# Patient Record
Sex: MALE | Race: WHITE | Employment: OTHER | URBAN - NONMETROPOLITAN AREA
[De-identification: names, ages, dates, MRNs, and addresses within clinical notes are randomized per-mention and may not be internally consistent; named-entity substitution may affect disease eponyms.]

---

## 2024-01-30 PROBLEM — I44.7 LBBB (LEFT BUNDLE BRANCH BLOCK): Status: ACTIVE | Noted: 2024-01-30

## 2024-01-30 PROBLEM — F17.200 CURRENT EVERY DAY SMOKER: Status: ACTIVE | Noted: 2024-01-30

## 2024-01-30 PROBLEM — I50.9 DYSPNEA DUE TO CONGESTIVE HEART FAILURE (MULTI): Status: ACTIVE | Noted: 2024-01-30

## 2024-01-30 PROBLEM — J44.9 CHRONIC OBSTRUCTIVE PULMONARY DISEASE (MULTI): Status: ACTIVE | Noted: 2024-01-30

## 2024-01-30 PROBLEM — I10 ESSENTIAL HYPERTENSION, BENIGN: Status: ACTIVE | Noted: 2024-01-30

## 2024-01-30 PROBLEM — E78.5 DYSLIPIDEMIA: Status: ACTIVE | Noted: 2024-01-30

## 2024-01-30 PROBLEM — I42.9 CARDIOMYOPATHY (MULTI): Status: ACTIVE | Noted: 2024-01-30

## 2024-01-30 RX ORDER — ALBUTEROL SULFATE 90 UG/1
AEROSOL, METERED RESPIRATORY (INHALATION)
COMMUNITY
Start: 2021-09-10

## 2024-01-30 RX ORDER — CARVEDILOL 12.5 MG/1
1 TABLET ORAL 2 TIMES DAILY
COMMUNITY
End: 2024-02-29 | Stop reason: SDUPTHER

## 2024-01-30 RX ORDER — NITROGLYCERIN 0.4 MG/1
0.4 TABLET SUBLINGUAL EVERY 5 MIN PRN
COMMUNITY

## 2024-01-30 RX ORDER — ASPIRIN 81 MG/1
2 TABLET ORAL DAILY
COMMUNITY

## 2024-01-30 RX ORDER — HYDROCHLOROTHIAZIDE 12.5 MG/1
12.5 TABLET ORAL DAILY
COMMUNITY

## 2024-01-30 RX ORDER — ALBUTEROL SULFATE 0.83 MG/ML
SOLUTION RESPIRATORY (INHALATION)
COMMUNITY
Start: 2021-09-27

## 2024-01-30 RX ORDER — SPIRONOLACTONE 25 MG/1
1 TABLET ORAL DAILY
COMMUNITY

## 2024-01-30 RX ORDER — LOSARTAN POTASSIUM 50 MG/1
50 TABLET ORAL 2 TIMES DAILY
COMMUNITY
End: 2024-04-03 | Stop reason: SDUPTHER

## 2024-01-30 RX ORDER — ATORVASTATIN CALCIUM 80 MG/1
1 TABLET, FILM COATED ORAL DAILY
COMMUNITY
End: 2024-03-01 | Stop reason: SDUPTHER

## 2024-02-20 ENCOUNTER — OFFICE VISIT (OUTPATIENT)
Dept: CARDIOLOGY | Facility: CLINIC | Age: 64
End: 2024-02-20
Payer: MEDICARE

## 2024-02-20 VITALS
HEIGHT: 74 IN | BODY MASS INDEX: 26.69 KG/M2 | SYSTOLIC BLOOD PRESSURE: 120 MMHG | WEIGHT: 208 LBS | HEART RATE: 64 BPM | DIASTOLIC BLOOD PRESSURE: 70 MMHG

## 2024-02-20 DIAGNOSIS — I42.9 CARDIOMYOPATHY, UNSPECIFIED TYPE (MULTI): ICD-10-CM

## 2024-02-20 DIAGNOSIS — F17.200 CURRENT EVERY DAY SMOKER: ICD-10-CM

## 2024-02-20 DIAGNOSIS — I10 ESSENTIAL HYPERTENSION, BENIGN: Primary | ICD-10-CM

## 2024-02-20 DIAGNOSIS — E66.3 OVERWEIGHT: ICD-10-CM

## 2024-02-20 DIAGNOSIS — J41.0 SIMPLE CHRONIC BRONCHITIS (MULTI): ICD-10-CM

## 2024-02-20 DIAGNOSIS — I44.7 LBBB (LEFT BUNDLE BRANCH BLOCK): ICD-10-CM

## 2024-02-20 DIAGNOSIS — E78.5 DYSLIPIDEMIA: ICD-10-CM

## 2024-02-20 LAB
NON-UH HIE AGAP: 11 MEQ/L (ref 6–16)
NON-UH HIE ALT: 16 INT._UNIT/L (ref 6–46)
NON-UH HIE AST: 17 INT._UNIT/L (ref 5–43)
NON-UH HIE BUN/CREAT RATIO: 18 NO UNITS (ref 10–20)
NON-UH HIE BUN: 27 MG/DL (ref 5–21)
NON-UH HIE CALCIUM LVL: 9.3 MG/DL (ref 8.9–11.1)
NON-UH HIE CHLORIDE: 105 MMOL/L (ref 101–111)
NON-UH HIE CO2: 28 MMOL/L (ref 21–31)
NON-UH HIE CREATININE: 1.5 MG/DL (ref 0.5–1.3)
NON-UH HIE EGFR: 51 ML/MIN/1.73 M2
NON-UH HIE GLUCOSE LVL: 93 MG/DL (ref 55–199)
NON-UH HIE HCT: 38 % (ref 37.7–49)
NON-UH HIE HGB: 12.9 GM/DL (ref 13.5–17.5)
NON-UH HIE MCH: 31.3 PG (ref 27–34)
NON-UH HIE MCHC: 34.2 GM/DL (ref 31.4–36)
NON-UH HIE MCV: 91.5 FL (ref 80–100)
NON-UH HIE MPV: 9.3 FL (ref 6.4–10.8)
NON-UH HIE PLATELET: 209 E9/L (ref 150–500)
NON-UH HIE POTASSIUM LVL: 4.7 MMOL/L (ref 3.5–5.3)
NON-UH HIE RBC MORPH: NORMAL
NON-UH HIE RBC: 4.1 E12/L (ref 4.3–5.9)
NON-UH HIE RDW: 16.1 % (ref 10.9–14.2)
NON-UH HIE SODIUM LVL: 139 MMOL/L (ref 135–145)
NON-UH HIE WBC: 10.4 E9/L (ref 4–11)

## 2024-02-20 PROCEDURE — 4004F PT TOBACCO SCREEN RCVD TLK: CPT | Performed by: INTERNAL MEDICINE

## 2024-02-20 PROCEDURE — 99214 OFFICE O/P EST MOD 30 MIN: CPT | Performed by: INTERNAL MEDICINE

## 2024-02-20 PROCEDURE — 1159F MED LIST DOCD IN RCRD: CPT | Performed by: INTERNAL MEDICINE

## 2024-02-20 PROCEDURE — 3078F DIAST BP <80 MM HG: CPT | Performed by: INTERNAL MEDICINE

## 2024-02-20 PROCEDURE — 3074F SYST BP LT 130 MM HG: CPT | Performed by: INTERNAL MEDICINE

## 2024-02-20 RX ORDER — BACLOFEN 20 MG
1 TABLET ORAL DAILY
COMMUNITY

## 2024-02-20 NOTE — PROGRESS NOTES
"Subjective   Lewis Bertrand is a 63 y.o. male       Chief Complaint    Follow-up          HPI   Patient is in the office for follow-up for the problems noted below.  He has sedentary lifestyle and he smokes half a pack of cigarettes per day.  He does have COPD with active wheezing and utilizes nebulizer.  He saw his PCP recently but no blood work was ordered.  He denies any symptoms suggestive of recurrence of heart failure.  His medical therapy has been well-tolerated and his blood pressure is under excellent control.  His weight has increased 14 pounds from last visit which was brought to his attention    Assessment/recommendations:     1-history of nonischemic cardiopathy believed to be due to hypertensive heart disease. His coronary arteries have mild disease confirmed by cardiac catheterization in January 2019. His ejection fraction has normalized up to 65% by echocardiogram 2019. Due to mild hypotension hydralazine will be discontinued  2-active tobacco use, counseling was provided again today for tobacco cessation  4-COPD due to tobacco abuse, patient uses inhalers, currently his COPD is very active and he is following with PCP  5-dyslipidemia on statin, lab data were ordered  6- hypertension , due to hypotension will discontinue hydralazine  7-left bundle-branch block, has not affected his EF.  8- overweight, patient has gained 14 pounds in the last several months which was brought to his attention and education to eat smaller portions and stay physically active was provided  Review of Systems   All other systems reviewed and are negative.           Vitals:    02/20/24 1128   BP: 120/70   BP Location: Right arm   Patient Position: Sitting   Pulse: 64   Weight: 94.3 kg (208 lb)   Height: 1.88 m (6' 2\")        Objective   Physical Exam  Constitutional:       Appearance: Normal appearance. He is normal weight.   HENT:      Nose: Nose normal.   Neck:      Vascular: No carotid bruit.   Cardiovascular:      Rate " and Rhythm: Normal rate.      Pulses: Normal pulses.      Heart sounds: Normal heart sounds.   Pulmonary:      Effort: Pulmonary effort is normal.      Breath sounds: Examination of the right-upper field reveals wheezing. Examination of the left-upper field reveals wheezing. Examination of the right-lower field reveals wheezing. Examination of the left-lower field reveals wheezing. Wheezing present.   Abdominal:      General: Bowel sounds are normal.      Palpations: Abdomen is soft.   Genitourinary:     Rectum: Normal.   Musculoskeletal:         General: Normal range of motion.      Cervical back: Normal range of motion.      Right lower leg: No edema.      Left lower leg: No edema.   Skin:     General: Skin is warm and dry.   Neurological:      General: No focal deficit present.      Mental Status: He is alert.   Psychiatric:         Mood and Affect: Mood normal.         Behavior: Behavior normal.         Thought Content: Thought content normal.         Judgment: Judgment normal.         Allergies  Patient has no known allergies.     Current Medications    Current Outpatient Medications:     albuterol (Ventolin HFA) 90 mcg/actuation inhaler, Inhale., Disp: , Rfl:     albuterol 2.5 mg /3 mL (0.083 %) nebulizer solution, Inhale., Disp: , Rfl:     aspirin 81 mg EC tablet, Take 2 tablets (162 mg) by mouth once daily., Disp: , Rfl:     carvedilol (Coreg) 12.5 mg tablet, Take 1 tablet (12.5 mg) by mouth 2 times a day., Disp: , Rfl:     hydroCHLOROthiazide (HYDRODiuril) 12.5 mg tablet, Take 1 tablet (12.5 mg) by mouth once daily., Disp: , Rfl:     Lipitor 80 mg tablet, Take 1 tablet (80 mg) by mouth once daily., Disp: , Rfl:     losartan (Cozaar) 50 mg tablet, Take 1 tablet (50 mg) by mouth 2 times a day., Disp: , Rfl:     magnesium oxide 500 mg magnesium tablet, Take 1 tablet (500 mg) by mouth once daily., Disp: , Rfl:     nitroglycerin (Nitrostat) 0.4 mg SL tablet, Place 1 tablet (0.4 mg) under the tongue every 5  minutes if needed., Disp: , Rfl:     spironolactone (Aldactone) 25 mg tablet, Take 1 tablet (25 mg) by mouth once daily., Disp: , Rfl:                      Assessment/Plan   1. Essential hypertension, benign  Basic Metabolic Panel    CBC    Follow Up In Cardiology      2. Cardiomyopathy, unspecified type (CMS/HCC)  Basic Metabolic Panel    CBC    Follow Up In Cardiology      3. LBBB (left bundle branch block)        4. Dyslipidemia  Lipid Panel    Alanine Aminotransferase    Aspartate Aminotransferase      5. Current every day smoker        6. Overweight        7. Simple chronic bronchitis (CMS/HCC)                 Scribe Attestation  By signing my name below, IdavidrleticlJamila aguirre   attest that this documentation has been prepared under the direction and in the presence of Roseanna Baker MD.     Provider Attestation - Scribe documentation    All medical record entries made by the Scribe were at my direction and personally dictated by me. I have reviewed the chart and agree that the record accurately reflects my personal performance of the history, physical exam, discussion and plan.

## 2024-02-20 NOTE — LETTER
February 20, 2024     Jose Aguilera MD  [address]  [facility] [address]    Patient: Lewis Bertrand   YOB: 1960   Date of Visit: 2/20/2024       Dear Dr. Jose Aguilera MD:    Thank you for referring Lewis Bertrand to me for evaluation. Below are my notes for this consultation.  If you have questions, please do not hesitate to call me. I look forward to following your patient along with you.       Sincerely,     Roseanna Baker MD      CC: No Recipients  ______________________________________________________________________________________    Subjective   Lewis Bertrand is a 63 y.o. male       Chief Complaint    Follow-up          HPI   Patient is in the office for follow-up for the problems noted below.  He has sedentary lifestyle and he smokes half a pack of cigarettes per day.  He does have COPD with active wheezing and utilizes nebulizer.  He saw his PCP recently but no blood work was ordered.  He denies any symptoms suggestive of recurrence of heart failure.  His medical therapy has been well-tolerated and his blood pressure is under excellent control.  His weight has increased 14 pounds from last visit which was brought to his attention    Assessment/recommendations:     1-history of nonischemic cardiopathy believed to be due to hypertensive heart disease. His coronary arteries have mild disease confirmed by cardiac catheterization in January 2019. His ejection fraction has normalized up to 65% by echocardiogram 2019. Due to mild hypotension hydralazine will be discontinued  2-active tobacco use, counseling was provided again today for tobacco cessation  4-COPD due to tobacco abuse, patient uses inhalers, currently his COPD is very active and he is following with PCP  5-dyslipidemia on statin, lab data were ordered  6- hypertension , due to hypotension will discontinue hydralazine  7-left bundle-branch block, has not affected his EF.  8- overweight, patient has  "gained 14 pounds in the last several months which was brought to his attention and education to eat smaller portions and stay physically active was provided  Review of Systems   All other systems reviewed and are negative.           Vitals:    02/20/24 1128   BP: 120/70   BP Location: Right arm   Patient Position: Sitting   Pulse: 64   Weight: 94.3 kg (208 lb)   Height: 1.88 m (6' 2\")        Objective   Physical Exam  Constitutional:       Appearance: Normal appearance. He is normal weight.   HENT:      Nose: Nose normal.   Neck:      Vascular: No carotid bruit.   Cardiovascular:      Rate and Rhythm: Normal rate.      Pulses: Normal pulses.      Heart sounds: Normal heart sounds.   Pulmonary:      Effort: Pulmonary effort is normal.      Breath sounds: Examination of the right-upper field reveals wheezing. Examination of the left-upper field reveals wheezing. Examination of the right-lower field reveals wheezing. Examination of the left-lower field reveals wheezing. Wheezing present.   Abdominal:      General: Bowel sounds are normal.      Palpations: Abdomen is soft.   Genitourinary:     Rectum: Normal.   Musculoskeletal:         General: Normal range of motion.      Cervical back: Normal range of motion.      Right lower leg: No edema.      Left lower leg: No edema.   Skin:     General: Skin is warm and dry.   Neurological:      General: No focal deficit present.      Mental Status: He is alert.   Psychiatric:         Mood and Affect: Mood normal.         Behavior: Behavior normal.         Thought Content: Thought content normal.         Judgment: Judgment normal.         Allergies  Patient has no known allergies.     Current Medications    Current Outpatient Medications:   •  albuterol (Ventolin HFA) 90 mcg/actuation inhaler, Inhale., Disp: , Rfl:   •  albuterol 2.5 mg /3 mL (0.083 %) nebulizer solution, Inhale., Disp: , Rfl:   •  aspirin 81 mg EC tablet, Take 2 tablets (162 mg) by mouth once daily., Disp: , " Rfl:   •  carvedilol (Coreg) 12.5 mg tablet, Take 1 tablet (12.5 mg) by mouth 2 times a day., Disp: , Rfl:   •  hydroCHLOROthiazide (HYDRODiuril) 12.5 mg tablet, Take 1 tablet (12.5 mg) by mouth once daily., Disp: , Rfl:   •  Lipitor 80 mg tablet, Take 1 tablet (80 mg) by mouth once daily., Disp: , Rfl:   •  losartan (Cozaar) 50 mg tablet, Take 1 tablet (50 mg) by mouth 2 times a day., Disp: , Rfl:   •  magnesium oxide 500 mg magnesium tablet, Take 1 tablet (500 mg) by mouth once daily., Disp: , Rfl:   •  nitroglycerin (Nitrostat) 0.4 mg SL tablet, Place 1 tablet (0.4 mg) under the tongue every 5 minutes if needed., Disp: , Rfl:   •  spironolactone (Aldactone) 25 mg tablet, Take 1 tablet (25 mg) by mouth once daily., Disp: , Rfl:                      Assessment/Plan   1. Essential hypertension, benign  Basic Metabolic Panel    CBC    Follow Up In Cardiology      2. Cardiomyopathy, unspecified type (CMS/HCC)  Basic Metabolic Panel    CBC    Follow Up In Cardiology      3. LBBB (left bundle branch block)        4. Dyslipidemia  Lipid Panel    Alanine Aminotransferase    Aspartate Aminotransferase      5. Current every day smoker        6. Overweight        7. Simple chronic bronchitis (CMS/HCC)                 Scribe Attestation  By signing my name below, viola DON Scribe   attest that this documentation has been prepared under the direction and in the presence of Roseanna Baker MD.     Provider Attestation - Scribe documentation    All medical record entries made by the Scribe were at my direction and personally dictated by me. I have reviewed the chart and agree that the record accurately reflects my personal performance of the history, physical exam, discussion and plan.

## 2024-02-20 NOTE — PATIENT INSTRUCTIONS
Please bring all medicines, vitamins, and herbal supplements with you when you come to the office.    Prescriptions will not be filled unless you are compliant with your follow up appointments or have a follow up appointment scheduled as per instruction of your physician. Refills should be requested at the time of your visit.     One year  Same medications.    BMI was above normal measurement. Current weight: 94.3 kg (208 lb)  Weight change since last visit (-) denotes wt loss 14 lbs   Weight loss needed to achieve BMI 25: 13.7 Lbs  Weight loss needed to achieve BMI 30: -25.2 Lbs  Provided instructions on dietary changes  Provided instructions on exercise  Advised to Increase physical activity.

## 2024-02-23 LAB
NON-UH HIE CHOL: 103 MG/DL (ref 120–200)
NON-UH HIE HDL: 34 MG/DL
NON-UH HIE LDL DIRECT: 57 MG/DL
NON-UH HIE TRIG: 83 MG/DL
NON-UH HIE VLDL: 17 MG/DL (ref 7–40)

## 2024-02-29 DIAGNOSIS — I42.9 CARDIOMYOPATHY, UNSPECIFIED TYPE (MULTI): ICD-10-CM

## 2024-02-29 RX ORDER — CARVEDILOL 12.5 MG/1
12.5 TABLET ORAL 2 TIMES DAILY
Qty: 180 TABLET | Refills: 3 | Status: SHIPPED | OUTPATIENT
Start: 2024-02-29

## 2024-03-01 DIAGNOSIS — E78.5 DYSLIPIDEMIA: ICD-10-CM

## 2024-03-04 RX ORDER — ATORVASTATIN CALCIUM 80 MG/1
80 TABLET, FILM COATED ORAL DAILY
Qty: 90 TABLET | Refills: 3 | Status: SHIPPED | OUTPATIENT
Start: 2024-03-04

## 2024-04-03 DIAGNOSIS — I10 ESSENTIAL HYPERTENSION, BENIGN: ICD-10-CM

## 2024-04-03 RX ORDER — LOSARTAN POTASSIUM 50 MG/1
50 TABLET ORAL 2 TIMES DAILY
Qty: 180 TABLET | Refills: 3 | Status: SHIPPED | OUTPATIENT
Start: 2024-04-03 | End: 2025-04-03

## 2025-02-18 ENCOUNTER — APPOINTMENT (OUTPATIENT)
Dept: CARDIOLOGY | Facility: CLINIC | Age: 65
End: 2025-02-18
Payer: MEDICARE

## 2025-02-18 VITALS
DIASTOLIC BLOOD PRESSURE: 64 MMHG | BODY MASS INDEX: 27.85 KG/M2 | SYSTOLIC BLOOD PRESSURE: 118 MMHG | WEIGHT: 217 LBS | HEIGHT: 74 IN | HEART RATE: 54 BPM

## 2025-02-18 DIAGNOSIS — E78.5 DYSLIPIDEMIA: ICD-10-CM

## 2025-02-18 DIAGNOSIS — E66.3 OVERWEIGHT: ICD-10-CM

## 2025-02-18 DIAGNOSIS — F17.200 CURRENT EVERY DAY SMOKER: ICD-10-CM

## 2025-02-18 DIAGNOSIS — I10 ESSENTIAL HYPERTENSION, BENIGN: ICD-10-CM

## 2025-02-18 DIAGNOSIS — J44.9 CHRONIC OBSTRUCTIVE PULMONARY DISEASE, UNSPECIFIED COPD TYPE (MULTI): ICD-10-CM

## 2025-02-18 DIAGNOSIS — I44.7 LBBB (LEFT BUNDLE BRANCH BLOCK): ICD-10-CM

## 2025-02-18 DIAGNOSIS — I42.9 CARDIOMYOPATHY, UNSPECIFIED TYPE (MULTI): Primary | ICD-10-CM

## 2025-02-18 PROCEDURE — 3078F DIAST BP <80 MM HG: CPT | Performed by: INTERNAL MEDICINE

## 2025-02-18 PROCEDURE — 3074F SYST BP LT 130 MM HG: CPT | Performed by: INTERNAL MEDICINE

## 2025-02-18 PROCEDURE — 99214 OFFICE O/P EST MOD 30 MIN: CPT | Performed by: INTERNAL MEDICINE

## 2025-02-18 PROCEDURE — 3008F BODY MASS INDEX DOCD: CPT | Performed by: INTERNAL MEDICINE

## 2025-02-18 PROCEDURE — 4004F PT TOBACCO SCREEN RCVD TLK: CPT | Performed by: INTERNAL MEDICINE

## 2025-02-18 RX ORDER — SPIRONOLACTONE 25 MG/1
12.5 TABLET ORAL DAILY
COMMUNITY

## 2025-02-18 RX ORDER — ASPIRIN 81 MG/1
81 TABLET ORAL DAILY
COMMUNITY

## 2025-02-18 RX ORDER — LOSARTAN POTASSIUM 25 MG/1
25 TABLET ORAL DAILY
COMMUNITY

## 2025-02-18 NOTE — LETTER
February 18, 2025     Jose Aguilera MD  [address]  [facility] [address]    Patient: Lewis Bertrand   YOB: 1960   Date of Visit: 2/18/2025       Dear Dr. Jose Aguilera MD:    Thank you for referring Lewis Bertrand to me for evaluation. Below are my notes for this consultation.  If you have questions, please do not hesitate to call me. I look forward to following your patient along with you.       Sincerely,     Roseanna Baker MD      CC: No Recipients  ______________________________________________________________________________________    Subjective   Lewis Bertrand is a 64 y.o. male       Chief Complaint    Annual Exam          HPI   Patient is in the office for annual follow-up for the problems noted below accompanied by his wife who was also seen by me today.  The patient has had no events since he was last seen.  His COPD is much better today than last time I saw him.  He continues to smoke half a pack of cigarette per day and he was again discouraged from continuing tobacco use.  He does not see pulmonary medicine and uses inhaler on a as needed basis.  His weight remains above normal.  He reports no orthopnea PND lower extremity edema and his medical therapy has been well-tolerated.  He is due for blood work which is scheduled.    Assessment/recommendations:     1-history of nonischemic cardiopathy believed to be due to hypertensive heart disease. His coronary arteries have mild disease confirmed by cardiac catheterization in January 2019. His ejection fraction has normalized up to 65% by echocardiogram 2019. Due to mild hypotension hydralazine will be discontinued  2-active tobacco use, counseling was provided again today for tobacco cessation  4-COPD due to tobacco abuse, patient uses inhalers as needed.  Discouraged him from continuing tobacco use.  5-dyslipidemia on statin, lab data were ordered  6-essential hypertension , currently under control  7-left  "bundle-branch block, has not affected his EF.  8- overweight, encouraged the patient to cut back on calorie consumption to bring his weight under control.  Review of Systems   All other systems reviewed and are negative.           Vitals:    02/18/25 1005   BP: 118/64   BP Location: Right arm   Patient Position: Sitting   Pulse: 54   Weight: 98.4 kg (217 lb)   Height: 1.88 m (6' 2\")        Objective   Physical Exam  Constitutional:       Appearance: Normal appearance.   HENT:      Nose: Nose normal.   Neck:      Vascular: No carotid bruit.   Cardiovascular:      Rate and Rhythm: Normal rate.      Pulses: Normal pulses.      Heart sounds: Normal heart sounds.   Pulmonary:      Effort: Pulmonary effort is normal.      Breath sounds: Decreased air movement present.      Comments: Bossman crackles  Abdominal:      General: Bowel sounds are normal.      Palpations: Abdomen is soft.   Musculoskeletal:         General: Normal range of motion.      Cervical back: Normal range of motion.      Right lower leg: No edema.      Left lower leg: No edema.   Skin:     General: Skin is warm and dry.   Neurological:      General: No focal deficit present.      Mental Status: He is alert.   Psychiatric:         Mood and Affect: Mood normal.         Behavior: Behavior normal.         Thought Content: Thought content normal.         Judgment: Judgment normal.         Allergies  Patient has no known allergies.     Current Medications    Current Outpatient Medications:   •  albuterol (Ventolin HFA) 90 mcg/actuation inhaler, Inhale., Disp: , Rfl:   •  albuterol 2.5 mg /3 mL (0.083 %) nebulizer solution, Inhale., Disp: , Rfl:   •  aspirin 81 mg EC tablet, Take 1 tablet (81 mg) by mouth once daily., Disp: , Rfl:   •  carvedilol (Coreg) 12.5 mg tablet, Take 1 tablet (12.5 mg) by mouth 2 times a day., Disp: 180 tablet, Rfl: 3  •  Lipitor 80 mg tablet, Take 1 tablet (80 mg) by mouth once daily., Disp: 90 tablet, Rfl: 3  •  losartan (Cozaar) 25 mg " tablet, Take 1 tablet (25 mg) by mouth once daily., Disp: , Rfl:   •  magnesium oxide 500 mg magnesium tablet, Take 1 tablet (500 mg) by mouth once daily., Disp: , Rfl:   •  nitroglycerin (Nitrostat) 0.4 mg SL tablet, Place 1 tablet (0.4 mg) under the tongue every 5 minutes if needed., Disp: , Rfl:   •  spironolactone (Aldactone) 25 mg tablet, Take 0.5 tablets (12.5 mg) by mouth once daily., Disp: , Rfl:                      Assessment/Plan   1. Cardiomyopathy, unspecified type (Multi)  Follow Up In Cardiology    Basic Metabolic Panel    CBC    Basic Metabolic Panel    CBC      2. Essential hypertension, benign  Follow Up In Cardiology    Follow Up In Cardiology      3. LBBB (left bundle branch block)        4. Dyslipidemia  Alanine Aminotransferase    Aspartate Aminotransferase    Lipid Panel    Alanine Aminotransferase    Aspartate Aminotransferase    Lipid Panel      5. Chronic obstructive pulmonary disease, unspecified COPD type (Multi)        6. Current every day smoker        7. BMI 27.0-27.9,adult                 Scribe Attestation  By signing my name below, Michelle DON LPN, Scribe   attest that this documentation has been prepared under the direction and in the presence of Roseanna Baker MD.     Provider Attestation - Scribe documentation    All medical record entries made by the Scribe were at my direction and personally dictated by me. I have reviewed the chart and agree that the record accurately reflects my personal performance of the history, physical exam, discussion and plan.

## 2025-02-18 NOTE — PROGRESS NOTES
"Subjective   Lewis Bertrand is a 64 y.o. male       Chief Complaint    Annual Exam          HPI   Patient is in the office for annual follow-up for the problems noted below accompanied by his wife who was also seen by me today.  The patient has had no events since he was last seen.  His COPD is much better today than last time I saw him.  He continues to smoke half a pack of cigarette per day and he was again discouraged from continuing tobacco use.  He does not see pulmonary medicine and uses inhaler on a as needed basis.  His weight remains above normal.  He reports no orthopnea PND lower extremity edema and his medical therapy has been well-tolerated.  He is due for blood work which is scheduled.    Assessment/recommendations:     1-history of nonischemic cardiopathy believed to be due to hypertensive heart disease. His coronary arteries have mild disease confirmed by cardiac catheterization in January 2019. His ejection fraction has normalized up to 65% by echocardiogram 2019. Due to mild hypotension hydralazine will be discontinued  2-active tobacco use, counseling was provided again today for tobacco cessation  4-COPD due to tobacco abuse, patient uses inhalers as needed.  Discouraged him from continuing tobacco use.  5-dyslipidemia on statin, lab data were ordered  6-essential hypertension , currently under control  7-left bundle-branch block, has not affected his EF.  8- overweight, encouraged the patient to cut back on calorie consumption to bring his weight under control.  Review of Systems   All other systems reviewed and are negative.           Vitals:    02/18/25 1005   BP: 118/64   BP Location: Right arm   Patient Position: Sitting   Pulse: 54   Weight: 98.4 kg (217 lb)   Height: 1.88 m (6' 2\")        Objective   Physical Exam  Constitutional:       Appearance: Normal appearance.   HENT:      Nose: Nose normal.   Neck:      Vascular: No carotid bruit.   Cardiovascular:      Rate and Rhythm: Normal " rate.      Pulses: Normal pulses.      Heart sounds: Normal heart sounds.   Pulmonary:      Effort: Pulmonary effort is normal.      Breath sounds: Decreased air movement present.      Comments: Bossman crackles  Abdominal:      General: Bowel sounds are normal.      Palpations: Abdomen is soft.   Musculoskeletal:         General: Normal range of motion.      Cervical back: Normal range of motion.      Right lower leg: No edema.      Left lower leg: No edema.   Skin:     General: Skin is warm and dry.   Neurological:      General: No focal deficit present.      Mental Status: He is alert.   Psychiatric:         Mood and Affect: Mood normal.         Behavior: Behavior normal.         Thought Content: Thought content normal.         Judgment: Judgment normal.         Allergies  Patient has no known allergies.     Current Medications    Current Outpatient Medications:     albuterol (Ventolin HFA) 90 mcg/actuation inhaler, Inhale., Disp: , Rfl:     albuterol 2.5 mg /3 mL (0.083 %) nebulizer solution, Inhale., Disp: , Rfl:     aspirin 81 mg EC tablet, Take 1 tablet (81 mg) by mouth once daily., Disp: , Rfl:     carvedilol (Coreg) 12.5 mg tablet, Take 1 tablet (12.5 mg) by mouth 2 times a day., Disp: 180 tablet, Rfl: 3    Lipitor 80 mg tablet, Take 1 tablet (80 mg) by mouth once daily., Disp: 90 tablet, Rfl: 3    losartan (Cozaar) 25 mg tablet, Take 1 tablet (25 mg) by mouth once daily., Disp: , Rfl:     magnesium oxide 500 mg magnesium tablet, Take 1 tablet (500 mg) by mouth once daily., Disp: , Rfl:     nitroglycerin (Nitrostat) 0.4 mg SL tablet, Place 1 tablet (0.4 mg) under the tongue every 5 minutes if needed., Disp: , Rfl:     spironolactone (Aldactone) 25 mg tablet, Take 0.5 tablets (12.5 mg) by mouth once daily., Disp: , Rfl:                      Assessment/Plan   1. Cardiomyopathy, unspecified type (Multi)  Follow Up In Cardiology    Basic Metabolic Panel    CBC    Basic Metabolic Panel    CBC      2. Essential  hypertension, benign  Follow Up In Cardiology    Follow Up In Cardiology      3. LBBB (left bundle branch block)        4. Dyslipidemia  Alanine Aminotransferase    Aspartate Aminotransferase    Lipid Panel    Alanine Aminotransferase    Aspartate Aminotransferase    Lipid Panel      5. Chronic obstructive pulmonary disease, unspecified COPD type (Multi)        6. Current every day smoker        7. BMI 27.0-27.9,adult                 Scribe Attestation  By signing my name below, Michelle DON LPN, Scribadan   attest that this documentation has been prepared under the direction and in the presence of Roseanna Baker MD.     Provider Attestation - Scribe documentation    All medical record entries made by the Scribe were at my direction and personally dictated by me. I have reviewed the chart and agree that the record accurately reflects my personal performance of the history, physical exam, discussion and plan.

## 2025-02-18 NOTE — PATIENT INSTRUCTIONS
Please bring all medicines, vitamins, and herbal supplements with you when you come to the office.    Prescriptions will not be filled unless you are compliant with your follow up appointments or have a follow up appointment scheduled as per instruction of your physician. Refills should be requested at the time of your visit    Lab work  One year.     BMI was above normal measurement. Current weight: 98.4 kg (217 lb)  Weight change since last visit (-) denotes wt loss 9 lbs   Weight loss needed to achieve BMI 25: 22.7 Lbs  Weight loss needed to achieve BMI 30: -16.2 Lbs  Provided instructions on dietary changes  Provided instructions on exercise.

## 2025-02-24 LAB
NON-UH HIE ALANINE AMINOTRANSFERASE:CCNC:PT:SER/PLAS:QN:NO ADDITION OF P-5': 17 INT._UNIT/L (ref 6–46)
NON-UH HIE ANION GAP:SCNC:PT:SER/PLAS:QN:: 9 MEQ/L (ref 6–16)
NON-UH HIE ASPARTATE AMINOTRANSFERASE:CCNC:PT:SER/PLAS:QN:: 16 INT._UNIT/L (ref 5–43)
NON-UH HIE CALCIUM:MCNC:PT:SER/PLAS:QN:: 9.4 MG/DL (ref 8.9–11.1)
NON-UH HIE CARBON DIOXIDE:SCNC:PT:SER/PLAS:QN:: 31 MMOL/L (ref 21–31)
NON-UH HIE CHLORIDE:SCNC:PT:SER/PLAS:QN:: 105 MMOL/L (ref 101–111)
NON-UH HIE CHOLESTEROL.IN HDL:MCNC:PT:SER/PLAS:QN:: 33 MG/DL
NON-UH HIE CHOLESTEROL.IN LDL:MCNC:PT:SER/PLAS:QN:: 69 MG/DL
NON-UH HIE CHOLESTEROL.IN VLDL:MCNC:PT:SER/PLAS:QN:CALCULATED: 25 MG/DL (ref 7–40)
NON-UH HIE CHOLESTEROL:MCNC:PT:SER/PLAS:QN:: 117 MG/DL (ref 120–200)
NON-UH HIE CREATININE:MCNC:PT:SER/PLAS:QN:: 1.6 MG/DL (ref 0.5–1.3)
NON-UH HIE EGFR: 47 ML/MIN/1.73 M2
NON-UH HIE ERYTHROCYTE DISTRIBUTION WIDTH:RATIO:PT:RBC:QN:AUTOMATED COUNT: 14.9 % (ref 10.9–14.2)
NON-UH HIE ERYTHROCYTE MEAN CORPUSCULAR HEMOGLOBIN CONCENTRATION:MCNC:PT:RB: 34.3 GM/DL (ref 31.4–36)
NON-UH HIE ERYTHROCYTE MEAN CORPUSCULAR HEMOGLOBIN:ENTMASS:PT:RBC:QN:AUTOMA: 32.1 PG (ref 27–34)
NON-UH HIE ERYTHROCYTE MEAN CORPUSCULAR VOLUME:ENTVOL:PT:RBC:QN:AUTOMATED C: 93.6 FL (ref 80–100)
NON-UH HIE ERYTHROCYTE SIZE:MORPH:PT:BLD:NOM:: NORMAL
NON-UH HIE ERYTHROCYTES:NCNC:PT:BLD:QN:AUTOMATED COUNT: 4.5 E12/L (ref 4.3–5.9)
NON-UH HIE GLUCOSE:MCNC:PT:SER/PLAS:QN:: 96 MG/DL (ref 55–199)
NON-UH HIE HEMATOCRIT:VFR:PT:BLD:QN:AUTOMATED COUNT: 42.4 % (ref 37.7–49)
NON-UH HIE HEMOGLOBIN:MCNC:PT:BLD:QN:: 14.5 GM/DL (ref 13.5–17.5)
NON-UH HIE LEUKOCYTES: 9.9 E9/L (ref 4–11)
NON-UH HIE PLATELET MEAN VOLUME:ENTVOL:PT:BLD:QN:AUTOMATED COUNT: 9.8 FL (ref 6.4–10.8)
NON-UH HIE PLATELETS:NCNC:PT:BLD:QN:AUTOMATED COUNT: 172 E9/L (ref 150–500)
NON-UH HIE POTASSIUM:SCNC:PT:SER/PLAS:QN:: 4.8 MMOL/L (ref 3.5–5.3)
NON-UH HIE SODIUM:SCNC:PT:SER/PLAS:QN:: 140 MMOL/L (ref 135–145)
NON-UH HIE TRIGLYCERIDE:MCNC:PT:SER/PLAS:QN:: 125 MG/DL
NON-UH HIE UREA NITROGEN/CREATININE:MRTO:PT:SER/PLAS:QN:: 22 NO UNITS (ref 10–20)
NON-UH HIE UREA NITROGEN:MCNC:PT:SER/PLAS:QN:: 35 MG/DL (ref 5–21)

## 2025-05-13 DIAGNOSIS — I42.9 CARDIOMYOPATHY, UNSPECIFIED TYPE (MULTI): ICD-10-CM

## 2025-05-13 DIAGNOSIS — I10 ESSENTIAL HYPERTENSION, BENIGN: ICD-10-CM

## 2025-05-14 RX ORDER — CARVEDILOL 12.5 MG/1
12.5 TABLET ORAL 2 TIMES DAILY
Qty: 180 TABLET | Refills: 3 | Status: SHIPPED | OUTPATIENT
Start: 2025-05-14

## 2025-08-08 DIAGNOSIS — I10 ESSENTIAL HYPERTENSION, BENIGN: Primary | ICD-10-CM

## 2025-08-08 RX ORDER — LOSARTAN POTASSIUM 25 MG/1
25 TABLET ORAL DAILY
Qty: 90 TABLET | Refills: 3 | Status: SHIPPED | OUTPATIENT
Start: 2025-08-08 | End: 2026-08-08

## 2026-02-19 ENCOUNTER — APPOINTMENT (OUTPATIENT)
Dept: CARDIOLOGY | Facility: CLINIC | Age: 66
End: 2026-02-19
Payer: COMMERCIAL